# Patient Record
Sex: MALE | Race: OTHER | NOT HISPANIC OR LATINO
[De-identification: names, ages, dates, MRNs, and addresses within clinical notes are randomized per-mention and may not be internally consistent; named-entity substitution may affect disease eponyms.]

---

## 2017-01-09 ENCOUNTER — APPOINTMENT (OUTPATIENT)
Dept: SURGERY | Facility: CLINIC | Age: 67
End: 2017-01-09

## 2017-01-09 PROBLEM — Z00.00 ENCOUNTER FOR PREVENTIVE HEALTH EXAMINATION: Status: ACTIVE | Noted: 2017-01-09

## 2019-08-12 ENCOUNTER — APPOINTMENT (OUTPATIENT)
Dept: SURGERY | Facility: CLINIC | Age: 69
End: 2019-08-12
Payer: MEDICARE

## 2019-08-12 VITALS — DIASTOLIC BLOOD PRESSURE: 80 MMHG | OXYGEN SATURATION: 95 % | HEART RATE: 86 BPM | SYSTOLIC BLOOD PRESSURE: 127 MMHG

## 2019-08-12 VITALS — WEIGHT: 132 LBS | BODY MASS INDEX: 24.92 KG/M2 | HEIGHT: 61 IN

## 2019-08-12 DIAGNOSIS — Z78.9 OTHER SPECIFIED HEALTH STATUS: ICD-10-CM

## 2019-08-12 DIAGNOSIS — Z84.89 FAMILY HISTORY OF OTHER SPECIFIED CONDITIONS: ICD-10-CM

## 2019-08-12 DIAGNOSIS — K42.9 UMBILICAL HERNIA W/OUT OBSTRUCTION OR GANGRENE: ICD-10-CM

## 2019-08-12 DIAGNOSIS — Z87.438 PERSONAL HISTORY OF OTHER DISEASES OF MALE GENITAL ORGANS: ICD-10-CM

## 2019-08-12 DIAGNOSIS — Z87.891 PERSONAL HISTORY OF NICOTINE DEPENDENCE: ICD-10-CM

## 2019-08-12 DIAGNOSIS — Z86.79 PERSONAL HISTORY OF OTHER DISEASES OF THE CIRCULATORY SYSTEM: ICD-10-CM

## 2019-08-12 DIAGNOSIS — K40.20 BILATERAL INGUINAL HERNIA, W/OUT OBSTRUCTION OR GANGRENE, NOT SPECIFIED AS RECURRENT: ICD-10-CM

## 2019-08-12 PROCEDURE — 99203 OFFICE O/P NEW LOW 30 MIN: CPT

## 2019-08-12 RX ORDER — AMLODIPINE BESYLATE 5 MG/1
TABLET ORAL
Refills: 0 | Status: ACTIVE | COMMUNITY

## 2019-08-12 RX ORDER — METOPROLOL TARTRATE 75 MG/1
TABLET, FILM COATED ORAL
Refills: 0 | Status: ACTIVE | COMMUNITY

## 2019-08-12 RX ORDER — VALACYCLOVIR 500 MG/1
TABLET, FILM COATED ORAL
Refills: 0 | Status: ACTIVE | COMMUNITY

## 2019-08-12 RX ORDER — TAMSULOSIN HYDROCHLORIDE 0.4 MG/1
CAPSULE ORAL
Refills: 0 | Status: ACTIVE | COMMUNITY

## 2019-08-12 RX ORDER — CLOPIDOGREL BISULFATE 75 MG/1
75 TABLET, FILM COATED ORAL
Refills: 0 | Status: ACTIVE | COMMUNITY

## 2019-08-12 NOTE — DATA REVIEWED
[FreeTextEntry1] : 	\par Exam requested by:\par PING ESCOBAR MD\par [address], [place]\par [facility] [address]\par SITE PERFORMED: FLUSHING\par SITE PHONE: [phone]\par Patient: AJ EDMONDS\par YOB: 1950\par Phone: [phone]\par MRN: [id number] Acc: [id number]\par Date of Exam: 06-\par  \par EXAM:  CT CHEST, ABDOMEN AND PELVIS WITHOUT AND WITH CONTRAST\par \par HISTORY:  Smoking history. Left renal mass versus hemorrhagic cyst as per prescription. \par \par TECHNIQUE:  CT examination of the chest, abdomen, and pelvis is obtained after the patient drank oral contrast and during the intravenous administration of 100 mL of Optiray 350 iodinated contrast material from a 100 mL vial. Portal venous phase imaging of the chest, abdomen, and pelvis is acquired. This is followed by delayed phase imaging through the abdomen. Reformatted 3 mm thick coronal and sagittal views are provided. One or more of the following dose reduction techniques were used: automated exposure control, adjustment of the mA and/or kV according to patient size, use of iterative reconstruction technique. \par \par COMPARISON:  CT abdomen and pelvis dated 5/2/2019 \par \par FINDINGS:\par \par CHEST: There is a 0.3 cm noncalcified right upper lobe nodule on series 11 image 78. The right lung is otherwise clear without evidence of infiltrate or pulmonary nodule. \par \par There is a 0.2 cm left lower lobe nodule on series 11 image 152. The remainder of left lower lobe nodule seen on prior exam are no longer visualized. The left lung is otherwise clear without evidence of infiltrate or pulmonary nodule. \par \par There is no evidence of bronchiectasis or pulmonary fibrosis. \par \par No evidence for pleural effusion or pericardial effusion. \par \par The heart is normal in size.\par \par The great vessels demonstrate normal course and caliber. \par \par No evidence for axillary or mediastinal lymphadenopathy. \par \par Thyroid gland demonstrates normal attenuation. \par \par ABDOMEN/PELVIS: There are 2 subcentimeter hepatic lesions too small to characterize by CT criteria; the larger of these 2 measures 0.3 cm. The liver otherwise demonstrates normal enhancement.\par \par The gallbladder is visualized.\par \par The pancreas, spleen, and adrenal glands are normal. \par \par The kidneys are normal in size, shape and position. In the right kidney, there is an exophytic circumscribed lesion which is hyperintense, measuring 20 units prior to the administration of intravenous contrast which does not enhance following the administration of intravenous contrast. There is a 2.6 x 2.3 cm simple right upper pole renal cyst. There is a 4.8 x 3.2 cm left mid pole parapelvic cyst containing milk of calcium; and an adjacent 1.6 cm simple cyst.  Now this is not substantially changed in size since prior exam. There is no renal or ureteral calculus, hydronephrosis, hydroureter or perinephric stranding. The kidneys demonstrate symmetric normal enhancement without evidence of enhancing mass lesion. \par \par The urinary bladder is unremarkable. \par \par There is no lymphadenopathy or ascites. \par \par There is mild diverticulosis. The visualized small and large bowel are otherwise unremarkable. There is a small hiatal hernia.\par \par There is mild aneurysmal dilatation of the infrarenal aorta to 2.7 cm.\par \par There are small bilateral fat-containing inguinal hernias.\par \par There is a small fat-containing umbilical hernia.\par \par There is no destructive lytic or blastic osseous defect. \par \par IMPRESSION:  \par 1. Small right upper lobe pulmonary nodule and an small left lower lobe pulmonary nodule. According to Fleischner Society recommendations, no further follow-up is necessary based on the size of these nodules if the patient is a low risk for pulmonary malignancy. If the patient is at high risk for pulmonary malignancy, single follow-up CT may be performed in 12 months. In the left lower lobe, partial resolution of the nodules since prior exam supports infectious/inflammatory etiology.\par 2. Right upper pole indeterminate renal lesion on prior exam is a hemorrhagic cyst. Additional bilateral renal cysts, as at prior exam.\par 3. Two subcentimeter hepatic lesions too small to characterize most likely benign.\par 4. Mild diverticulosis.\par 5. Small hiatal hernia.\par 6. Mild aneurysmal dilatation of the infrarenal aorta to 2.7 cm without change since prior exam.\par 7. Small bilateral fat-containing inguinal hernias and small fat-containing umbilical hernia without evidence of strangulation\par \par Thank you for the opportunity to participate in the care of this patient.  \par  \par KAMINI DIXON MD  - Electronically Signed: 07- 8:09 AM \par Physician to Physician Direct Line is: [phone]\par Exam requested by:PING ESCOBAR MD

## 2019-08-12 NOTE — PLAN
[FreeTextEntry1] :  Patient was told significance of findings, options, risks and benefits were explained.  We will arrange for pre-surgical testing and schedule the patient for Right inguinal hernia  and umbilical hernia  repair   at [facility].

## 2019-08-12 NOTE — PHYSICAL EXAM
[Oriented to Person] : oriented to person [Alert] : alert [Oriented to Time] : oriented to time [Oriented to Place] : oriented to place [Calm] : calm [de-identified] : The patient is alert, well-groomed, and cheerful. [de-identified] :   anicteric.  Nasal mucosa pink, septum midline. Oral mucosa pink.  Tongue midline, Pharynx without exudates.\par  \par small BL  inguinal hernia.     No penile discharge or lesions.  No scrotal swelling or discoloration. Testes descended bilaterally, smooth, without masses. Epididymis non-tender.  medium umbilical hernia. skin is normal.  [de-identified] : Thorax symmetric with good excursion. Lungs resonant. Breath sounds vesicular with no added sounds.\par   [de-identified] :  Neck supple. Trachea midline. Thyroid isthmus barely palpable, lobes not felt.\par   [de-identified] : \par   BL  inguinal hernia.   right is larger then left.   No penile discharge or lesions.  No scrotal swelling or discoloration. Testes descended bilaterally, smooth, without masses. Epididymis non-tender.  medium umbilical hernia. skin overlying the hernia  is normal.

## 2019-08-12 NOTE — HISTORY OF PRESENT ILLNESS
[de-identified] : This is  a 52 year old patient  who was referred by Dr. Alexander. patient had a CT scan of abdomen and pelvis on 06//28/2019 that showed   Small bilateral fat-containing inguinal hernias and small fat-containing umbilical hernia without evidence of strangulation  patient is stating that he has pain in the right groin  and in periumbilical area.  patient states he had a CT scan because of the continuous bloating and acid reflux.  Last EGD 2 years ago. Patient reports good bowel movements and appetite. \par \par

## 2019-08-12 NOTE — ASSESSMENT
[FreeTextEntry1] : Patient was told significance of findings, options, risks and benefits were explained.  Informed consent for right inguinal and umbilical hernia repair and potential risks, benefits and alternatives (surgical options were discussed including non-surgical options or the option of no surgery) to the planned surgery were discussed in depth.  All surgical options were discussed including non-surgical treatments.\par  \par

## 2019-09-11 ENCOUNTER — OUTPATIENT (OUTPATIENT)
Dept: OUTPATIENT SERVICES | Facility: HOSPITAL | Age: 69
LOS: 1 days | End: 2019-09-11
Payer: COMMERCIAL

## 2019-09-11 VITALS
RESPIRATION RATE: 18 BRPM | TEMPERATURE: 97 F | WEIGHT: 130.95 LBS | HEART RATE: 77 BPM | SYSTOLIC BLOOD PRESSURE: 155 MMHG | DIASTOLIC BLOOD PRESSURE: 82 MMHG | HEIGHT: 60 IN | OXYGEN SATURATION: 97 %

## 2019-09-11 DIAGNOSIS — K42.0 UMBILICAL HERNIA WITH OBSTRUCTION, WITHOUT GANGRENE: ICD-10-CM

## 2019-09-11 DIAGNOSIS — K40.90 UNILATERAL INGUINAL HERNIA, WITHOUT OBSTRUCTION OR GANGRENE, NOT SPECIFIED AS RECURRENT: ICD-10-CM

## 2019-09-11 DIAGNOSIS — K42.9 UMBILICAL HERNIA WITHOUT OBSTRUCTION OR GANGRENE: ICD-10-CM

## 2019-09-11 DIAGNOSIS — Z98.49 CATARACT EXTRACTION STATUS, UNSPECIFIED EYE: Chronic | ICD-10-CM

## 2019-09-11 DIAGNOSIS — Z01.818 ENCOUNTER FOR OTHER PREPROCEDURAL EXAMINATION: ICD-10-CM

## 2019-09-11 DIAGNOSIS — I73.9 PERIPHERAL VASCULAR DISEASE, UNSPECIFIED: ICD-10-CM

## 2019-09-11 DIAGNOSIS — I10 ESSENTIAL (PRIMARY) HYPERTENSION: ICD-10-CM

## 2019-09-11 PROCEDURE — G0463: CPT

## 2019-09-11 NOTE — H&P PST ADULT - NSICDXPROBLEM_GEN_ALL_CORE_FT
PROBLEM DIAGNOSES  Problem: Right inguinal hernia  Assessment and Plan: Patient scheduled for repair of right inguinal hernia and umbilical hernia on 9/13/19.    Problem: Umbilical hernia  Assessment and Plan: Patient scheduled for repair of umbilical and right  inguinal hernia on 9/13/19. Preop instructions given.    Problem: PAD (peripheral artery disease)  Assessment and Plan: Patient is  no longer taking Plavix as per PCP instructions.    Problem: HTN (hypertension)  Assessment and Plan: Continue antihypertensive medicastios as prescribed.

## 2019-09-11 NOTE — H&P PST ADULT - HISTORY OF PRESENT ILLNESS
69 years  old male with PMH of HTN, pre diabetes, BPH, PAD, diverticulosis, renal cyst, AAA present to [facility] for presurgical evaluation. Patient c/o feeling gassy after eating food for sometime and was recently diagnosed with umbilical hernia and right inguinal hernia .Patient is scheduled for repair of right inguinal and umbilical hernia on 9/13/19.

## 2019-09-11 NOTE — H&P PST ADULT - NSICDXPASTMEDICALHX_GEN_ALL_CORE_FT
PAST MEDICAL HISTORY:  Bilateral dry eyes     History of BPH     HLD (hyperlipidemia)     HTN (hypertension)     PAD (peripheral artery disease)     Pre-existing type 2 diabetes mellitus PAST MEDICAL HISTORY:  AAA (abdominal aortic aneurysm)     Bilateral dry eyes     History of BPH     HLD (hyperlipidemia)     HTN (hypertension)     PAD (peripheral artery disease)     Pre-existing type 2 diabetes mellitus     Renal cyst     Right inguinal hernia     Umbilical hernia

## 2019-09-12 ENCOUNTER — TRANSCRIPTION ENCOUNTER (OUTPATIENT)
Age: 69
End: 2019-09-12

## 2019-09-13 ENCOUNTER — OUTPATIENT (OUTPATIENT)
Dept: OUTPATIENT SERVICES | Facility: HOSPITAL | Age: 69
LOS: 1 days | Discharge: HOME | End: 2019-09-13
Payer: COMMERCIAL

## 2019-09-13 ENCOUNTER — APPOINTMENT (OUTPATIENT)
Dept: SURGERY | Facility: HOSPITAL | Age: 69
End: 2019-09-13

## 2019-09-13 VITALS
DIASTOLIC BLOOD PRESSURE: 75 MMHG | HEART RATE: 60 BPM | SYSTOLIC BLOOD PRESSURE: 127 MMHG | OXYGEN SATURATION: 98 % | TEMPERATURE: 98 F | WEIGHT: 130.95 LBS | HEIGHT: 60 IN | RESPIRATION RATE: 18 BRPM

## 2019-09-13 VITALS
DIASTOLIC BLOOD PRESSURE: 69 MMHG | OXYGEN SATURATION: 99 % | HEART RATE: 73 BPM | SYSTOLIC BLOOD PRESSURE: 127 MMHG | TEMPERATURE: 98 F | RESPIRATION RATE: 16 BRPM

## 2019-09-13 DIAGNOSIS — Z98.49 CATARACT EXTRACTION STATUS, UNSPECIFIED EYE: Chronic | ICD-10-CM

## 2019-09-13 DIAGNOSIS — K40.90 UNILATERAL INGUINAL HERNIA, WITHOUT OBSTRUCTION OR GANGRENE, NOT SPECIFIED AS RECURRENT: ICD-10-CM

## 2019-09-13 DIAGNOSIS — K42.0 UMBILICAL HERNIA WITH OBSTRUCTION, WITHOUT GANGRENE: ICD-10-CM

## 2019-09-13 LAB — GLUCOSE BLDC GLUCOMTR-MCNC: 99 MG/DL — SIGNIFICANT CHANGE UP (ref 70–99)

## 2019-09-13 PROCEDURE — 49585: CPT

## 2019-09-13 PROCEDURE — 49585: CPT | Mod: AS

## 2019-09-13 PROCEDURE — C1781: CPT

## 2019-09-13 PROCEDURE — 82962 GLUCOSE BLOOD TEST: CPT

## 2019-09-13 PROCEDURE — 49505 PRP I/HERN INIT REDUC >5 YR: CPT | Mod: AS,RT

## 2019-09-13 PROCEDURE — 49505 PRP I/HERN INIT REDUC >5 YR: CPT | Mod: RT

## 2019-09-13 RX ORDER — SODIUM CHLORIDE 9 MG/ML
1000 INJECTION, SOLUTION INTRAVENOUS
Refills: 0 | Status: DISCONTINUED | OUTPATIENT
Start: 2019-09-13 | End: 2019-09-21

## 2019-09-13 RX ORDER — FENTANYL CITRATE 50 UG/ML
25 INJECTION INTRAVENOUS
Refills: 0 | Status: DISCONTINUED | OUTPATIENT
Start: 2019-09-13 | End: 2019-09-13

## 2019-09-13 RX ORDER — SODIUM CHLORIDE 9 MG/ML
1000 INJECTION, SOLUTION INTRAVENOUS
Refills: 0 | Status: DISCONTINUED | OUTPATIENT
Start: 2019-09-13 | End: 2019-09-13

## 2019-09-13 RX ORDER — ONDANSETRON 8 MG/1
4 TABLET, FILM COATED ORAL ONCE
Refills: 0 | Status: DISCONTINUED | OUTPATIENT
Start: 2019-09-13 | End: 2019-09-13

## 2019-09-13 RX ORDER — CLOPIDOGREL BISULFATE 75 MG/1
1 TABLET, FILM COATED ORAL
Qty: 0 | Refills: 0 | DISCHARGE

## 2019-09-13 RX ORDER — AMLODIPINE BESYLATE 2.5 MG/1
1 TABLET ORAL
Qty: 0 | Refills: 0 | DISCHARGE

## 2019-09-13 RX ORDER — METOPROLOL TARTRATE 50 MG
1 TABLET ORAL
Qty: 0 | Refills: 0 | DISCHARGE

## 2019-09-13 RX ORDER — OXYCODONE AND ACETAMINOPHEN 5; 325 MG/1; MG/1
1 TABLET ORAL EVERY 4 HOURS
Refills: 0 | Status: DISCONTINUED | OUTPATIENT
Start: 2019-09-13 | End: 2019-09-13

## 2019-09-13 RX ORDER — ATORVASTATIN CALCIUM 80 MG/1
1 TABLET, FILM COATED ORAL
Qty: 0 | Refills: 0 | DISCHARGE

## 2019-09-13 RX ORDER — SODIUM CHLORIDE 9 MG/ML
3 INJECTION INTRAMUSCULAR; INTRAVENOUS; SUBCUTANEOUS EVERY 8 HOURS
Refills: 0 | Status: DISCONTINUED | OUTPATIENT
Start: 2019-09-13 | End: 2019-09-13

## 2019-09-13 RX ORDER — HYDROMORPHONE HYDROCHLORIDE 2 MG/ML
0.5 INJECTION INTRAMUSCULAR; INTRAVENOUS; SUBCUTANEOUS
Refills: 0 | Status: DISCONTINUED | OUTPATIENT
Start: 2019-09-13 | End: 2019-09-13

## 2019-09-13 NOTE — ASU PATIENT PROFILE, ADULT - PMH
AAA (abdominal aortic aneurysm)    Bilateral dry eyes    History of BPH    HLD (hyperlipidemia)    HTN (hypertension)    PAD (peripheral artery disease)    Pre-existing type 2 diabetes mellitus    Renal cyst    Right inguinal hernia    Umbilical hernia

## 2019-09-13 NOTE — ASU DISCHARGE PLAN (ADULT/PEDIATRIC) - CALL YOUR DOCTOR IF YOU HAVE ANY OF THE FOLLOWING:
Bleeding that does not stop Pain not relieved by Medications/Fever greater than (need to indicate Fahrenheit or Celsius)/Nausea and vomiting that does not stop/Bleeding that does not stop

## 2019-09-13 NOTE — ASU DISCHARGE PLAN (ADULT/PEDIATRIC) - CARE PROVIDER_API CALL
Anibal Pan)  Surgery  [address] [facility], [place] [address]  Phone: [phone]  Fax: [phone]  Follow Up Time:

## 2019-09-13 NOTE — BRIEF OPERATIVE NOTE - NSICDXBRIEFPROCEDURE_GEN_ALL_CORE_FT
PROCEDURES:  Open repair of incarcerated inguinal hernia using mesh in adult 13-Sep-2019 09:09:46 right Rose Marie Bradford  Repair, hernia, umbilical, open, adult 13-Sep-2019 09:09:39  Rose Marie Bradford

## 2019-09-13 NOTE — BRIEF OPERATIVE NOTE - NSICDXBRIEFPREOP_GEN_ALL_CORE_FT
PRE-OP DIAGNOSIS:  Umbilical hernia 13-Sep-2019 09:10:01  Rose Marie Bradford  Right inguinal hernia 13-Sep-2019 09:10:09  Rose Marie Bradford

## 2019-09-13 NOTE — BRIEF OPERATIVE NOTE - NSICDXBRIEFPOSTOP_GEN_ALL_CORE_FT
POST-OP DIAGNOSIS:  Right inguinal hernia 13-Sep-2019 09:10:21  Rose Marie Bradford  Umbilical hernia 13-Sep-2019 09:10:14  Rose Marie Bradford

## 2019-09-16 PROBLEM — H04.123 DRY EYE SYNDROME OF BILATERAL LACRIMAL GLANDS: Chronic | Status: ACTIVE | Noted: 2019-09-11

## 2019-09-16 PROBLEM — I71.4 ABDOMINAL AORTIC ANEURYSM, WITHOUT RUPTURE: Chronic | Status: ACTIVE | Noted: 2019-09-11

## 2019-09-16 PROBLEM — K40.90 UNILATERAL INGUINAL HERNIA, WITHOUT OBSTRUCTION OR GANGRENE, NOT SPECIFIED AS RECURRENT: Chronic | Status: ACTIVE | Noted: 2019-09-11

## 2019-09-16 PROBLEM — I10 ESSENTIAL (PRIMARY) HYPERTENSION: Chronic | Status: ACTIVE | Noted: 2019-09-11

## 2019-09-16 PROBLEM — E11.9 TYPE 2 DIABETES MELLITUS WITHOUT COMPLICATIONS: Chronic | Status: ACTIVE | Noted: 2019-09-11

## 2019-09-16 PROBLEM — I73.9 PERIPHERAL VASCULAR DISEASE, UNSPECIFIED: Chronic | Status: ACTIVE | Noted: 2019-09-11

## 2019-09-16 PROBLEM — Z87.438 PERSONAL HISTORY OF OTHER DISEASES OF MALE GENITAL ORGANS: Chronic | Status: ACTIVE | Noted: 2019-09-11

## 2019-09-16 PROBLEM — N28.1 CYST OF KIDNEY, ACQUIRED: Chronic | Status: ACTIVE | Noted: 2019-09-11

## 2019-09-16 PROBLEM — K42.9 UMBILICAL HERNIA WITHOUT OBSTRUCTION OR GANGRENE: Chronic | Status: ACTIVE | Noted: 2019-09-11

## 2019-09-16 PROBLEM — E78.5 HYPERLIPIDEMIA, UNSPECIFIED: Chronic | Status: ACTIVE | Noted: 2019-09-11

## 2021-01-16 ENCOUNTER — TRANSCRIPTION ENCOUNTER (OUTPATIENT)
Age: 71
End: 2021-01-16

## 2025-03-13 NOTE — ASU PATIENT PROFILE, ADULT - VISION (WITH CORRECTIVE LENSES IF THE PATIENT USUALLY WEARS THEM):
Email response sent to patient.     Normal vision: sees adequately in most situations; can see medication labels, newsprint